# Patient Record
Sex: MALE | Race: WHITE | NOT HISPANIC OR LATINO
[De-identification: names, ages, dates, MRNs, and addresses within clinical notes are randomized per-mention and may not be internally consistent; named-entity substitution may affect disease eponyms.]

---

## 2018-10-09 ENCOUNTER — NON-APPOINTMENT (OUTPATIENT)
Age: 43
End: 2018-10-09

## 2018-10-09 ENCOUNTER — APPOINTMENT (OUTPATIENT)
Dept: VASCULAR SURGERY | Facility: CLINIC | Age: 43
End: 2018-10-09
Payer: COMMERCIAL

## 2018-10-09 VITALS
HEART RATE: 55 BPM | SYSTOLIC BLOOD PRESSURE: 117 MMHG | BODY MASS INDEX: 27.98 KG/M2 | TEMPERATURE: 98.2 F | HEIGHT: 75 IN | DIASTOLIC BLOOD PRESSURE: 73 MMHG | WEIGHT: 225 LBS

## 2018-10-09 DIAGNOSIS — I83.899 VARICOSE VEINS OF UNSPECIFIED LOWER EXTREMITY WITH OTHER COMPLICATIONS: ICD-10-CM

## 2018-10-09 DIAGNOSIS — I83.813 VARICOSE VEINS OF BILATERAL LOWER EXTREMITIES WITH PAIN: ICD-10-CM

## 2018-10-09 PROCEDURE — 93971 EXTREMITY STUDY: CPT | Mod: RT

## 2018-10-09 PROCEDURE — 99214 OFFICE O/P EST MOD 30 MIN: CPT

## 2018-10-09 RX ORDER — SIMVASTATIN 10 MG/1
10 TABLET, FILM COATED ORAL
Refills: 0 | Status: ACTIVE | COMMUNITY

## 2018-12-10 ENCOUNTER — APPOINTMENT (OUTPATIENT)
Dept: VASCULAR SURGERY | Facility: CLINIC | Age: 43
End: 2018-12-10
Payer: COMMERCIAL

## 2018-12-10 DIAGNOSIS — I83.819 VARICOSE VEINS OF UNSPECIFIED LOWER EXTREMITY WITH PAIN: ICD-10-CM

## 2018-12-10 DIAGNOSIS — I83.891 VARICOSE VEINS OF RIGHT LOWER EXTREMITY WITH OTHER COMPLICATIONS: ICD-10-CM

## 2018-12-10 PROCEDURE — 37766 PHLEB VEINS - EXTREM 20+: CPT | Mod: RT

## 2019-01-08 ENCOUNTER — APPOINTMENT (OUTPATIENT)
Dept: VASCULAR SURGERY | Facility: CLINIC | Age: 44
End: 2019-01-08

## 2021-12-01 ENCOUNTER — TRANSCRIPTION ENCOUNTER (OUTPATIENT)
Age: 46
End: 2021-12-01

## 2021-12-06 ENCOUNTER — RESULT REVIEW (OUTPATIENT)
Age: 46
End: 2021-12-06

## 2022-03-07 ENCOUNTER — APPOINTMENT (OUTPATIENT)
Dept: VASCULAR SURGERY | Facility: CLINIC | Age: 47
End: 2022-03-07
Payer: COMMERCIAL

## 2022-03-07 VITALS
SYSTOLIC BLOOD PRESSURE: 127 MMHG | BODY MASS INDEX: 27.98 KG/M2 | HEART RATE: 65 BPM | WEIGHT: 225 LBS | HEIGHT: 75 IN | DIASTOLIC BLOOD PRESSURE: 82 MMHG | TEMPERATURE: 96.4 F

## 2022-03-07 PROCEDURE — XXXXX: CPT

## 2022-03-07 PROCEDURE — 93970 EXTREMITY STUDY: CPT

## 2022-03-07 PROCEDURE — 99204 OFFICE O/P NEW MOD 45 MIN: CPT

## 2022-03-07 RX ORDER — ATORVASTATIN CALCIUM 10 MG/1
10 TABLET, FILM COATED ORAL
Qty: 30 | Refills: 0 | Status: ACTIVE | COMMUNITY
Start: 2021-10-05

## 2022-03-07 RX ORDER — OMEPRAZOLE 20 MG/1
20 CAPSULE, DELAYED RELEASE ORAL
Qty: 30 | Refills: 0 | Status: ACTIVE | COMMUNITY
Start: 2021-09-29

## 2022-03-07 NOTE — DATA REVIEWED
[FreeTextEntry1] : 3/7/2022 Venous Doppler Rodrigo LE  no acute dvt svt \par                            RLE GSV  surgically removed from sfj and prox thigh  \par                                   GSV insuff from mid thigh to distal calf level w insuff collaterals\par                                   Rt CFV  insuff  and ectatic \par                            LLE GSV  insuff from distal thigh to  mid calf level w insuff collaterals\par \par \par

## 2022-03-07 NOTE — ASSESSMENT
[FreeTextEntry1] : Impression  symptomatic venous insuff \par \par \par Plan Med Conservative management leg elevation, knee high compression stockings 20-30mm Hg, wt loss, diet control, exercise program, protective measures\par Indications risks and benefits of ned leg  sclerotherapy were explained to pt who understands\par Pt consents and wants to proceed \par \par \par \par Varicose veins are enlarged, twisted veins. Varicose veins are caused by increased blood pressure in the veins.  The blood moves towards the heart by 1-way valves in the veins. When the valves become weakened or damaged, blood can collect in the veins and pool in your lower legs (ankles). This causes the veins to become enlarged and incompetent with reflux. Sitting or standing for long periods can cause blood to pool in the leg veins, increasing the pressure within the veins. \par Risk factors for varicose veins or venous disease may include:  obesity, older age, standing or sitting for prolonged periods of time for several years, being female, pregnancy, taking oral contraceptive pills or hormone replacement, being inactive, and/or smoking. \par The most common symptoms of varicose veins are sensations in the legs, such as a heavy feeling, burning, and/or aching. However, each individual may experience symptoms differently.  Other symptoms may include:  color changes in the skin, sores on the legs, or rash.  Severe varicose veins or venous disease may eventually produce long-term mild swelling that can result in more serious skin and tissue problems, such as ulcers and non-healing sores.\par Varicose veins and venous disease are diagnosed by a complete medical history, physical examination, and diagnostic studies for varicose veins including duplex ultrasound and color-flow imaging.  \par Medical treatment for varicose veins and venous disease include:  compression stockings, sclerotherapy, endovenous ablation and/or surgical treatment with microphlebectomy.  \par \par  [Arterial/Venous Disease] : arterial/venous disease [Other: _____] : [unfilled]

## 2022-03-07 NOTE — HISTORY OF PRESENT ILLNESS
[FreeTextEntry1] : pt previously indervent   in  2016 and 2018 lle gsv rfa and lle and rle stab phleb\par pt presents w c/o new v veins and spider v\par pt is partially complaint w comp stockings

## 2022-03-07 NOTE — PHYSICAL EXAM
[JVD] : no jugular venous distention  [Normal Breath Sounds] : Normal breath sounds [Right Carotid Bruit] : no bruit heard over the right carotid [Left Carotid Bruit] : no bruit heard over the left carotid [1+] : left 1+ [2+] : left 2+ [Ankle Swelling (On Exam)] : present [Ankle Swelling Bilaterally] : bilaterally  [Varicose Veins Of Lower Extremities] : bilaterally [Ankle Swelling On The Right] : mild [] : bilaterally [Ankle Swelling On The Left] : moderate [Abdomen Masses] : No abdominal masses [No HSM] : no hepatosplenomegaly [Tender] : was nontender [No Rash or Lesion] : No rash or lesion [Alert] : alert [Oriented to Person] : oriented to person [Oriented to Place] : oriented to place [Oriented to Time] : oriented to time [de-identified] : wnl [Calm] : calm [de-identified] : nad [FreeTextEntry1] : Moderate bilateral  leg venous insufficiency \par w mild bilateral leg stasis dermatitis, hyperpigmentation in the gator area, hyperkeratosis (skin thickening)\par and mild to moderate bilateral leg edema \par Multiple  bilateral leg small varicose  veins and spider veins  ant post medial distal  thigh and calf and shin \par RLE Varicose veins measuring  1-2 , 2-3 mm in size on the thigh/calf /shin \par LLE Varicose veins measuring  1-2 , 2-3 mm in size on the thigh/calf /shin \par no wounds/ulcers\par \par \par  [de-identified] : wnl [de-identified] : Rodrigo Cranial nerves 2-12 rodrigo grossly intact [de-identified] : cooperative

## 2022-04-27 ENCOUNTER — APPOINTMENT (OUTPATIENT)
Dept: VASCULAR SURGERY | Facility: CLINIC | Age: 47
End: 2022-04-27
Payer: SELF-PAY

## 2022-04-27 DIAGNOSIS — I83.893 VARICOSE VEINS OF BILATERAL LOWER EXTREMITIES WITH OTHER COMPLICATIONS: ICD-10-CM

## 2022-04-27 DIAGNOSIS — I87.2 VENOUS INSUFFICIENCY (CHRONIC) (PERIPHERAL): ICD-10-CM

## 2022-04-27 DIAGNOSIS — I78.1 NEVUS, NON-NEOPLASTIC: ICD-10-CM

## 2022-04-27 PROCEDURE — 36468 NJX SCLRSNT SPIDER VEINS: CPT

## 2022-04-27 NOTE — PROCEDURE
[FreeTextEntry1] : bilateral sclerotherapy [FreeTextEntry3] : Procedural safety checklist and time out completed:\par Confirmed patient identification (Patient Name, , and/or medical record number including when possible affirmation by patient or parent/family/other).\par Confirmed procedure with the patient. Consent present, accurate and signed. \par Confirmed special equipment and supplies are present.\par Sterility confirmed. Position verified. \par Site/ side is marked and visible and confirmed. \par Procedure confirmed by consent. Accurate consent including side and site.\par Review of medical records, including venous ultrasound, noting correct procedure including site and side.\par MD/PA verifies presence and review of imaging studies and or written report of imaging studies.\par Agreement on the procedure to be performed\par Time out completed.\par All of the above has been confirmed by the team.\par All patient-specific concerns have been addressed. \par \par Indication:  bilateral lower extremity spider veins.\par \par Procedure: bilateral lower extremity sclerotherapy. \par \par Procedure Note:  Mr. RIGO VALERA is a 46 year old M with bilateral lower extremity spider and branch veins. \par \par I have discussed the risks of the procedure with the patient. Detailed discussion was held with the patient. Risks of itching, superficial thrombophlebitis, hyperpigmentation (darkening of the skin), allergic reactions, skin irritation due to extravasation of the sclerosant, air-embolism, and in rare cases deep vein thrombosis were all discussed with the patient.  Reviewed with patient that sclerotherapy is the injection of a sterile agent (polidocanol) into the spider and small branch varicose veins. It works by damaging the inner wall of the vein. Eventually, the vein collapses on itself and over time, the damaged vein is replaced with fibrous connective tissue and prevents blood flow through the vessel. Sclerotherapy does not prevent the development of new veins. Before the treatment, photos may be obtained.\par The patient agrees to the procedure. The patient was escorted into the procedure room, placed on the procedure table and a time out was called. The  legs for sclerotherapy treatment were cleaned with 70% isopropyl alcohol. \par \par Sclerosant used was 1.0 % polidocanol (Asclera). Each session consists of a total dose of  4 mL of 1.0 % Asclera (polidocanol) which is directly injected using a 30 gauge needle into the superficial spider veins and small branch veins. \par \par The following areas were injected  anterior medial and lateral  thigh calf and shin \par 1.0 % Asclera  lot# [id number], expiration  2022\par \par Dry gauze was applied to the treated areas of the leg and a compression bandage was applied. Patient instructed to wear the bandage for 72 hours and then wear 20-30mmHg compression stockings daily for minimum of 2 weeks. Patient may remove compression bandage after 24 hrs, shower and don compression stockings for continuous compression x 72 hrs. Patient tolerated the procedure well. A follow-up appt is scheduled for the patient and instructions provided. \par  \par \par \par

## 2023-06-05 ENCOUNTER — NON-APPOINTMENT (OUTPATIENT)
Age: 48
End: 2023-06-05